# Patient Record
Sex: FEMALE | Race: WHITE | URBAN - METROPOLITAN AREA
[De-identification: names, ages, dates, MRNs, and addresses within clinical notes are randomized per-mention and may not be internally consistent; named-entity substitution may affect disease eponyms.]

---

## 2021-09-21 PROBLEM — Z34.90 PRENATAL CARE, ANTEPARTUM: Status: ACTIVE | Noted: 2021-09-21

## 2021-09-21 PROBLEM — O34.219 PREVIOUS CESAREAN SECTION COMPLICATING PREGNANCY: Status: ACTIVE | Noted: 2021-09-21

## 2021-11-28 PROBLEM — Z34.80 ENCOUNTER FOR SUPERVISION OF OTHER NORMAL PREGNANCY, UNSPECIFIED TRIMESTER: Status: ACTIVE | Noted: 2021-11-28

## 2022-01-03 PROBLEM — O98.512 COVID-19 AFFECTING PREGNANCY IN SECOND TRIMESTER: Status: ACTIVE | Noted: 2022-01-03

## 2022-01-03 PROBLEM — U07.1 COVID-19 AFFECTING PREGNANCY IN SECOND TRIMESTER: Status: ACTIVE | Noted: 2022-01-03

## 2023-05-30 LAB
AMB EXT HEMATOCRIT: 37.4
AMB EXT HEMOGLOBIN: 12.5
AMB EXT PLATELETS: 241
ANTIBODY SCREEN OB: NEGATIVE
HEPATITIS B SURFACE ANTIGEN OB: NEGATIVE
HIV RESULT OB: NEGATIVE
RAPID PLASMA REAGIN OB: NONREACTIVE
RH FACTOR OB: POSITIVE

## 2023-06-20 ENCOUNTER — TELEPHONE (OUTPATIENT)
Dept: PERINATAL CARE | Facility: HOSPITAL | Age: 38
End: 2023-06-20

## 2023-06-20 NOTE — TELEPHONE ENCOUNTER
Gave fax # [phone] to have order faxed in prior to scheduling with [facility].

## 2023-09-25 LAB
AMB EXT TREPONEMAL ANTIBODIES: NONREACTIVE
HIV RESULT OB: NEGATIVE

## 2023-11-27 LAB — STREP GP B CULT OB: POSITIVE

## 2023-12-08 ENCOUNTER — TELEPHONE (OUTPATIENT)
Dept: OBGYN UNIT | Facility: HOSPITAL | Age: 38
End: 2023-12-08

## 2023-12-11 ENCOUNTER — ANESTHESIA EVENT (OUTPATIENT)
Dept: OBGYN UNIT | Facility: HOSPITAL | Age: 38
End: 2023-12-11
Payer: COMMERCIAL

## 2023-12-11 ENCOUNTER — HOSPITAL ENCOUNTER (INPATIENT)
Facility: HOSPITAL | Age: 38
LOS: 3 days | Discharge: HOME | End: 2023-12-14
Attending: OBSTETRICS & GYNECOLOGY | Admitting: OBSTETRICS & GYNECOLOGY
Payer: COMMERCIAL

## 2023-12-11 ENCOUNTER — ANESTHESIA (OUTPATIENT)
Dept: OBGYN UNIT | Facility: HOSPITAL | Age: 38
End: 2023-12-11
Payer: COMMERCIAL

## 2023-12-11 PROBLEM — Z34.90 PREGNANT: Status: ACTIVE | Noted: 2023-12-11

## 2023-12-11 LAB
ANTIBODY SCREEN: NEGATIVE
BASOPHILS # BLD AUTO: 0.04 X10(3) UL (ref 0–0.2)
BASOPHILS NFR BLD AUTO: 0.4 %
DEPRECATED RDW RBC AUTO: 41.6 FL (ref 35.1–46.3)
EOSINOPHIL # BLD AUTO: 0.07 X10(3) UL (ref 0–0.7)
EOSINOPHIL NFR BLD AUTO: 0.7 %
ERYTHROCYTE [DISTWIDTH] IN BLOOD BY AUTOMATED COUNT: 12.7 % (ref 11–15)
HCT VFR BLD AUTO: 40.8 %
HGB BLD-MCNC: 14.5 G/DL
IMM GRANULOCYTES # BLD AUTO: 0.1 X10(3) UL (ref 0–1)
IMM GRANULOCYTES NFR BLD: 1.1 %
LYMPHOCYTES # BLD AUTO: 2.48 X10(3) UL (ref 1–4)
LYMPHOCYTES NFR BLD AUTO: 26.1 %
MCH RBC QN AUTO: 32.1 PG (ref 26–34)
MCHC RBC AUTO-ENTMCNC: 35.5 G/DL (ref 31–37)
MCV RBC AUTO: 90.3 FL
MONOCYTES # BLD AUTO: 0.72 X10(3) UL (ref 0.1–1)
MONOCYTES NFR BLD AUTO: 7.6 %
NEUTROPHILS # BLD AUTO: 6.09 X10 (3) UL (ref 1.5–7.7)
NEUTROPHILS # BLD AUTO: 6.09 X10(3) UL (ref 1.5–7.7)
NEUTROPHILS NFR BLD AUTO: 64.1 %
PLATELET # BLD AUTO: 224 10(3)UL (ref 150–450)
RBC # BLD AUTO: 4.52 X10(6)UL
RH BLOOD TYPE: POSITIVE
RH BLOOD TYPE: POSITIVE
WBC # BLD AUTO: 9.5 X10(3) UL (ref 4–11)

## 2023-12-11 PROCEDURE — 86901 BLOOD TYPING SEROLOGIC RH(D): CPT | Performed by: OBSTETRICS & GYNECOLOGY

## 2023-12-11 PROCEDURE — S0028 INJECTION, FAMOTIDINE, 20 MG: HCPCS | Performed by: OBSTETRICS & GYNECOLOGY

## 2023-12-11 PROCEDURE — 86900 BLOOD TYPING SEROLOGIC ABO: CPT | Performed by: OBSTETRICS & GYNECOLOGY

## 2023-12-11 PROCEDURE — 85025 COMPLETE CBC W/AUTO DIFF WBC: CPT | Performed by: OBSTETRICS & GYNECOLOGY

## 2023-12-11 PROCEDURE — 86850 RBC ANTIBODY SCREEN: CPT | Performed by: OBSTETRICS & GYNECOLOGY

## 2023-12-11 RX ORDER — METOCLOPRAMIDE HYDROCHLORIDE 5 MG/ML
10 INJECTION INTRAMUSCULAR; INTRAVENOUS ONCE
Status: COMPLETED | OUTPATIENT
Start: 2023-12-11 | End: 2023-12-11

## 2023-12-11 RX ORDER — MORPHINE SULFATE 1 MG/ML
INJECTION, SOLUTION EPIDURAL; INTRATHECAL; INTRAVENOUS
Status: COMPLETED | OUTPATIENT
Start: 2023-12-11 | End: 2023-12-11

## 2023-12-11 RX ORDER — FAMOTIDINE 20 MG/1
20 TABLET, FILM COATED ORAL ONCE
Status: COMPLETED | OUTPATIENT
Start: 2023-12-11 | End: 2023-12-11

## 2023-12-11 RX ORDER — KETOROLAC TROMETHAMINE 30 MG/ML
30 INJECTION, SOLUTION INTRAMUSCULAR; INTRAVENOUS ONCE
Status: DISCONTINUED | OUTPATIENT
Start: 2023-12-11 | End: 2023-12-14

## 2023-12-11 RX ORDER — CEFAZOLIN SODIUM/WATER 2 G/20 ML
2 SYRINGE (ML) INTRAVENOUS EVERY 8 HOURS
Status: COMPLETED | OUTPATIENT
Start: 2023-12-12 | End: 2023-12-12

## 2023-12-11 RX ORDER — GABAPENTIN 300 MG/1
300 CAPSULE ORAL EVERY 8 HOURS PRN
Status: DISCONTINUED | OUTPATIENT
Start: 2023-12-11 | End: 2023-12-14

## 2023-12-11 RX ORDER — ACETAMINOPHEN 500 MG
1000 TABLET ORAL ONCE
Status: COMPLETED | OUTPATIENT
Start: 2023-12-11 | End: 2023-12-11

## 2023-12-11 RX ORDER — DIPHENHYDRAMINE HYDROCHLORIDE 50 MG/ML
12.5 INJECTION INTRAMUSCULAR; INTRAVENOUS EVERY 4 HOURS PRN
Status: ACTIVE | OUTPATIENT
Start: 2023-12-11 | End: 2023-12-12

## 2023-12-11 RX ORDER — CEFAZOLIN SODIUM/WATER 2 G/20 ML
2 SYRINGE (ML) INTRAVENOUS EVERY 8 HOURS
Qty: 60 ML | Refills: 0 | Status: COMPLETED | OUTPATIENT
Start: 2023-12-11 | End: 2023-12-11

## 2023-12-11 RX ORDER — SIMETHICONE 80 MG
80 TABLET,CHEWABLE ORAL 3 TIMES DAILY PRN
Status: DISCONTINUED | OUTPATIENT
Start: 2023-12-11 | End: 2023-12-14

## 2023-12-11 RX ORDER — METOCLOPRAMIDE 10 MG/1
10 TABLET ORAL ONCE
Status: COMPLETED | OUTPATIENT
Start: 2023-12-11 | End: 2023-12-11

## 2023-12-11 RX ORDER — HYDROCODONE BITARTRATE AND ACETAMINOPHEN 7.5; 325 MG/1; MG/1
2 TABLET ORAL EVERY 6 HOURS PRN
Status: ACTIVE | OUTPATIENT
Start: 2023-12-11 | End: 2023-12-12

## 2023-12-11 RX ORDER — IBUPROFEN 600 MG/1
600 TABLET ORAL EVERY 6 HOURS
Status: DISCONTINUED | OUTPATIENT
Start: 2023-12-12 | End: 2023-12-14

## 2023-12-11 RX ORDER — PROCHLORPERAZINE EDISYLATE 5 MG/ML
5 INJECTION INTRAMUSCULAR; INTRAVENOUS ONCE AS NEEDED
Status: ACTIVE | OUTPATIENT
Start: 2023-12-11 | End: 2023-12-11

## 2023-12-11 RX ORDER — HYDROMORPHONE HYDROCHLORIDE 2 MG/1
2 TABLET ORAL EVERY 4 HOURS PRN
Status: DISCONTINUED | OUTPATIENT
Start: 2023-12-11 | End: 2023-12-14

## 2023-12-11 RX ORDER — HYDROMORPHONE HYDROCHLORIDE 1 MG/ML
0.2 INJECTION, SOLUTION INTRAMUSCULAR; INTRAVENOUS; SUBCUTANEOUS EVERY 2 HOUR PRN
Status: DISCONTINUED | OUTPATIENT
Start: 2023-12-11 | End: 2023-12-14

## 2023-12-11 RX ORDER — FAMOTIDINE 10 MG/ML
20 INJECTION, SOLUTION INTRAVENOUS ONCE
Status: COMPLETED | OUTPATIENT
Start: 2023-12-11 | End: 2023-12-11

## 2023-12-11 RX ORDER — BUPIVACAINE HYDROCHLORIDE 7.5 MG/ML
INJECTION, SOLUTION INTRASPINAL
Status: COMPLETED | OUTPATIENT
Start: 2023-12-11 | End: 2023-12-11

## 2023-12-11 RX ORDER — DIPHENHYDRAMINE HCL 25 MG
25 CAPSULE ORAL EVERY 4 HOURS PRN
Status: ACTIVE | OUTPATIENT
Start: 2023-12-11 | End: 2023-12-12

## 2023-12-11 RX ORDER — CITRIC ACID/SODIUM CITRATE 334-500MG
30 SOLUTION, ORAL ORAL ONCE
Status: COMPLETED | OUTPATIENT
Start: 2023-12-11 | End: 2023-12-11

## 2023-12-11 RX ORDER — CHOLECALCIFEROL (VITAMIN D3) 25 MCG
1 TABLET,CHEWABLE ORAL DAILY
Status: DISCONTINUED | OUTPATIENT
Start: 2023-12-11 | End: 2023-12-14

## 2023-12-11 RX ORDER — HYDROCODONE BITARTRATE AND ACETAMINOPHEN 7.5; 325 MG/1; MG/1
1 TABLET ORAL EVERY 6 HOURS PRN
Status: ACTIVE | OUTPATIENT
Start: 2023-12-11 | End: 2023-12-12

## 2023-12-11 RX ORDER — HALOPERIDOL 5 MG/ML
0.5 INJECTION INTRAMUSCULAR ONCE AS NEEDED
Status: ACTIVE | OUTPATIENT
Start: 2023-12-11 | End: 2023-12-11

## 2023-12-11 RX ORDER — ONDANSETRON 2 MG/ML
4 INJECTION INTRAMUSCULAR; INTRAVENOUS EVERY 6 HOURS PRN
Status: DISCONTINUED | OUTPATIENT
Start: 2023-12-11 | End: 2023-12-14

## 2023-12-11 RX ORDER — ACETAMINOPHEN 500 MG
1000 TABLET ORAL EVERY 6 HOURS
Status: DISCONTINUED | OUTPATIENT
Start: 2023-12-11 | End: 2023-12-14

## 2023-12-11 RX ORDER — NALBUPHINE HYDROCHLORIDE 10 MG/ML
2.5 INJECTION, SOLUTION INTRAMUSCULAR; INTRAVENOUS; SUBCUTANEOUS EVERY 4 HOURS PRN
Status: ACTIVE | OUTPATIENT
Start: 2023-12-11 | End: 2023-12-12

## 2023-12-11 RX ORDER — NALOXONE HYDROCHLORIDE 0.4 MG/ML
0.08 INJECTION, SOLUTION INTRAMUSCULAR; INTRAVENOUS; SUBCUTANEOUS
Status: ACTIVE | OUTPATIENT
Start: 2023-12-11 | End: 2023-12-12

## 2023-12-11 RX ORDER — NALBUPHINE HYDROCHLORIDE 10 MG/ML
2.5 INJECTION, SOLUTION INTRAMUSCULAR; INTRAVENOUS; SUBCUTANEOUS
Status: DISCONTINUED | OUTPATIENT
Start: 2023-12-11 | End: 2023-12-14

## 2023-12-11 RX ORDER — SODIUM CHLORIDE, SODIUM LACTATE, POTASSIUM CHLORIDE, CALCIUM CHLORIDE 600; 310; 30; 20 MG/100ML; MG/100ML; MG/100ML; MG/100ML
INJECTION, SOLUTION INTRAVENOUS CONTINUOUS
Status: DISCONTINUED | OUTPATIENT
Start: 2023-12-11 | End: 2023-12-14

## 2023-12-11 RX ORDER — PHENYLEPHRINE HCL 10 MG/ML
VIAL (ML) INJECTION AS NEEDED
Status: DISCONTINUED | OUTPATIENT
Start: 2023-12-11 | End: 2023-12-11 | Stop reason: SURG

## 2023-12-11 RX ORDER — CEFAZOLIN SODIUM/WATER 2 G/20 ML
2 SYRINGE (ML) INTRAVENOUS ONCE
Status: COMPLETED | OUTPATIENT
Start: 2023-12-11 | End: 2023-12-11

## 2023-12-11 RX ORDER — ACETAMINOPHEN 325 MG/1
650 TABLET ORAL EVERY 6 HOURS PRN
Status: ACTIVE | OUTPATIENT
Start: 2023-12-11 | End: 2023-12-12

## 2023-12-11 RX ORDER — SODIUM CHLORIDE, SODIUM LACTATE, POTASSIUM CHLORIDE, CALCIUM CHLORIDE 600; 310; 30; 20 MG/100ML; MG/100ML; MG/100ML; MG/100ML
125 INJECTION, SOLUTION INTRAVENOUS CONTINUOUS
Status: DISCONTINUED | OUTPATIENT
Start: 2023-12-11 | End: 2023-12-11 | Stop reason: HOSPADM

## 2023-12-11 RX ORDER — ONDANSETRON 2 MG/ML
INJECTION INTRAMUSCULAR; INTRAVENOUS AS NEEDED
Status: DISCONTINUED | OUTPATIENT
Start: 2023-12-11 | End: 2023-12-11 | Stop reason: SURG

## 2023-12-11 RX ORDER — KETOROLAC TROMETHAMINE 30 MG/ML
INJECTION, SOLUTION INTRAMUSCULAR; INTRAVENOUS AS NEEDED
Status: DISCONTINUED | OUTPATIENT
Start: 2023-12-11 | End: 2023-12-11 | Stop reason: SURG

## 2023-12-11 RX ORDER — DOCUSATE SODIUM 100 MG/1
100 CAPSULE, LIQUID FILLED ORAL
Status: DISCONTINUED | OUTPATIENT
Start: 2023-12-11 | End: 2023-12-14

## 2023-12-11 RX ORDER — ONDANSETRON 2 MG/ML
4 INJECTION INTRAMUSCULAR; INTRAVENOUS EVERY 6 HOURS PRN
Status: DISCONTINUED | OUTPATIENT
Start: 2023-12-11 | End: 2023-12-11 | Stop reason: ALTCHOICE

## 2023-12-11 RX ORDER — AMMONIA INHALANTS 0.04 G/.3ML
0.3 INHALANT RESPIRATORY (INHALATION) AS NEEDED
Status: DISCONTINUED | OUTPATIENT
Start: 2023-12-11 | End: 2023-12-14

## 2023-12-11 RX ORDER — ONDANSETRON 2 MG/ML
4 INJECTION INTRAMUSCULAR; INTRAVENOUS ONCE AS NEEDED
Status: ACTIVE | OUTPATIENT
Start: 2023-12-11 | End: 2023-12-11

## 2023-12-11 RX ORDER — KETOROLAC TROMETHAMINE 30 MG/ML
30 INJECTION, SOLUTION INTRAMUSCULAR; INTRAVENOUS EVERY 6 HOURS
Status: COMPLETED | OUTPATIENT
Start: 2023-12-11 | End: 2023-12-12

## 2023-12-11 RX ORDER — BISACODYL 10 MG
10 SUPPOSITORY, RECTAL RECTAL ONCE AS NEEDED
Status: DISCONTINUED | OUTPATIENT
Start: 2023-12-11 | End: 2023-12-14

## 2023-12-11 RX ORDER — HYDROMORPHONE HYDROCHLORIDE 1 MG/ML
0.6 INJECTION, SOLUTION INTRAMUSCULAR; INTRAVENOUS; SUBCUTANEOUS
Status: ACTIVE | OUTPATIENT
Start: 2023-12-11 | End: 2023-12-12

## 2023-12-11 RX ORDER — HYDROMORPHONE HYDROCHLORIDE 1 MG/ML
0.4 INJECTION, SOLUTION INTRAMUSCULAR; INTRAVENOUS; SUBCUTANEOUS
Status: ACTIVE | OUTPATIENT
Start: 2023-12-11 | End: 2023-12-12

## 2023-12-11 RX ADMIN — SODIUM CHLORIDE, SODIUM LACTATE, POTASSIUM CHLORIDE, CALCIUM CHLORIDE: 600; 310; 30; 20 INJECTION, SOLUTION INTRAVENOUS at 08:41:00

## 2023-12-11 RX ADMIN — MORPHINE SULFATE 0.3 MG: 1 INJECTION, SOLUTION EPIDURAL; INTRATHECAL; INTRAVENOUS at 08:26:00

## 2023-12-11 RX ADMIN — KETOROLAC TROMETHAMINE 30 MG: 30 INJECTION, SOLUTION INTRAMUSCULAR; INTRAVENOUS at 09:05:00

## 2023-12-11 RX ADMIN — PHENYLEPHRINE HCL 200 MCG: 10 MG/ML VIAL (ML) INJECTION at 08:38:00

## 2023-12-11 RX ADMIN — SODIUM CHLORIDE, SODIUM LACTATE, POTASSIUM CHLORIDE, CALCIUM CHLORIDE: 600; 310; 30; 20 INJECTION, SOLUTION INTRAVENOUS at 09:20:00

## 2023-12-11 RX ADMIN — BUPIVACAINE HYDROCHLORIDE 1.5 ML: 7.5 INJECTION, SOLUTION INTRASPINAL at 08:26:00

## 2023-12-11 RX ADMIN — SODIUM CHLORIDE, SODIUM LACTATE, POTASSIUM CHLORIDE, CALCIUM CHLORIDE: 600; 310; 30; 20 INJECTION, SOLUTION INTRAVENOUS at 08:21:00

## 2023-12-11 RX ADMIN — SODIUM CHLORIDE, SODIUM LACTATE, POTASSIUM CHLORIDE, CALCIUM CHLORIDE: 600; 310; 30; 20 INJECTION, SOLUTION INTRAVENOUS at 09:12:00

## 2023-12-11 RX ADMIN — CEFAZOLIN SODIUM/WATER 2 G: 2 G/20 ML SYRINGE (ML) INTRAVENOUS at 08:31:00

## 2023-12-11 RX ADMIN — ONDANSETRON 4 MG: 2 INJECTION INTRAMUSCULAR; INTRAVENOUS at 09:05:00

## 2023-12-11 RX ADMIN — PHENYLEPHRINE HCL 200 MCG: 10 MG/ML VIAL (ML) INJECTION at 08:31:00

## 2023-12-11 NOTE — H&P
[facility]     section History & Physical    Amada Marie Patient Status:  Surgery Admit - Inpt    1985 MRN [id number]   Location [address]  Attending Alfredo Rollins MD   Hosp Day # 0 PCP Unknown Pcp     Date of Admission:  2023    Reason for Admission: Scheduled  section    HPI:   Amada Marie is a 41year old  female, current EGA of 39w0d with an estimated date of delivery of: 2023, by Last Menstrual Period who presents for scheduled  section. Pt denies N/V/F/C/CP/SOB, LOF, VB, dizziness, RUQ pain, blurry vision, HA.  (+) FM. Her current obstetrical history is significant for  AMA, h/o C/S x 2 and GBS (+) . Patient Active Problem List   Diagnosis    Prenatal care, antepartum    Previous  section complicating pregnancy    Encounter for supervision of other normal pregnancy, unspecified trimester    COVID-19 affecting pregnancy in second trimester       Fetal Movement reported as Yes. GBS positive. Rh positive. History   Obstetric History:   OB History    Para Term  AB Living   3 2 2     2   SAB IAB Ectopic Multiple Live Births           2      # Outcome Date GA Lbr Uriah/2nd Weight Sex Delivery Anes PTL Lv   3 Current            2 Term 22 40w0d  7 lb 10 oz (3.459 kg) M CS-LTranv Spinal  KALYA      Birth Comments: Repeat low transverse    1 Term 19 39w2d  6 lb 11 oz (3.033 kg) M CS-Unspec Spinal N KAYLA      Birth Comments: breech       Gyne History:  Last pap smear: 2023: Negative cytology    Past Medical History: None    Past Surgerical History:   Past Surgical History:   Procedure Laterality Date      2019       Social History:   Social History     Tobacco Use    Smoking status: Never    Smokeless tobacco: Never   Substance Use Topics    Alcohol use: Not Currently        Allergies/Medications: Allergies:    Allergies   Allergen Reactions Pantoprazole RASH       Medications:  No medications prior to admission. Review of Systems:   As documented in HPI      Physical Exam:        Constitutional: alert and cooperative, in no apparent distress    Abdomen: gravid, nontender, Gravid    Vaginal exam: deferred    FHT assessment:   Baseline: 120 bpm   Variability: moderate   Accels:  present   Decels: No   Tocos:  Irregular   Category: 1 tracing    Neurologic: Alert and oriented;   Psychiatric: Cooperative    Results:   No results found for this or any previous visit (from the past 24 hour(s)). No results found. Assessment/Plan:   IUP at 39w0d for scheduled  section. Obstetrical history significant for  AMA, h/o C/S x 2 and GBS (+)  . Patient Active Problem List   Diagnosis    Prenatal care, antepartum    Previous  section complicating pregnancy    Encounter for supervision of other normal pregnancy, unspecified trimester    COVID-19 affecting pregnancy in second trimester       Anesthesia planned: spinal    Risks, benefits, alternatives and possible complications have been discussed in detail with the patient. Risks including but not limited to pain, bleeding, infection, possibility of adhesions, damage to adjacent organs (including but not limited to ureters, bowel, bladder, major blood vessels, and nerve endings). PT verbalized understanding and wished to proceed with a repeat C/S. Pre-admission, admission, and post admission procedures and expectations were discussed in detail. All questions answered; all appropriate consents will be signed at the [address] [place] [address] at 39 1/7 wks  FWBR overall: category 1  H/o C/S x 2: desires repeat C/S.   Discussed r/b/a and she verbalized understanding and wishes to proceed with C/S  GBS (+): Pt will receive surgical Ppx  CPM    Karel Fu MD  12/10/2023  8:25 PM

## 2023-12-11 NOTE — ANESTHESIA PROCEDURE NOTES
Spinal Block    Date/Time: 12/11/2023 8:26 AM    Performed by: Kaylie Kaye MD  Authorized by: Kaylie Kaye MD      General Information and Staff    Start Time:  12/11/2023 8:26 AM  End Time:  12/11/2023 8:27 AM  Anesthesiologist:  Kaylie Kaye MD  Performed by:   Anesthesiologist  Patient Location:  OB  Site identification: surface landmarks    Reason for Block: at surgeon's request, post-op pain management and surgical anesthesia    Preanesthetic Checklist: patient identified, IV checked, risks and benefits discussed, monitors and equipment checked, pre-op evaluation, timeout performed, anesthesia consent and sterile technique used      Procedure Details    Patient Position:  Sitting  Prep: ChloraPrep    Monitoring:  Cardiac monitor  Approach:  Midline  Location:  L3-4  Injection Technique:  Single-shot    Needle    Needle Type:  Pencil-tip  Needle Gauge:  24 G  Needle Length:  3.5 in    Assessment    Sensory Level:  T4  Events: clear CSF, CSF aspirated, well tolerated and blood negative      Additional Comments

## 2023-12-11 NOTE — PROGRESS NOTES
Patient transferred to mother/baby room 360 per cart in stable condition with baby and personal belongings. Accompanied by  and staff. Report given to mother/baby RN Zhanna.

## 2023-12-11 NOTE — PROGRESS NOTES
Pt is a 41year old female admitted to TR5/TR5-A. Chief Complaint   Patient presents with    Scheduled      Prev C/S x2      Pt is  39w1d intra-uterine pregnancy. History obtained, consents signed. Oriented to room, staff, and plan of care.

## 2023-12-12 LAB
BASOPHILS # BLD AUTO: 0.03 X10(3) UL (ref 0–0.2)
BASOPHILS NFR BLD AUTO: 0.2 %
DEPRECATED RDW RBC AUTO: 43.8 FL (ref 35.1–46.3)
EOSINOPHIL # BLD AUTO: 0.09 X10(3) UL (ref 0–0.7)
EOSINOPHIL NFR BLD AUTO: 0.7 %
ERYTHROCYTE [DISTWIDTH] IN BLOOD BY AUTOMATED COUNT: 12.9 % (ref 11–15)
HCT VFR BLD AUTO: 35 %
HGB BLD-MCNC: 11.8 G/DL
IMM GRANULOCYTES # BLD AUTO: 0.09 X10(3) UL (ref 0–1)
IMM GRANULOCYTES NFR BLD: 0.7 %
LYMPHOCYTES # BLD AUTO: 1.93 X10(3) UL (ref 1–4)
LYMPHOCYTES NFR BLD AUTO: 15.9 %
MCH RBC QN AUTO: 31.4 PG (ref 26–34)
MCHC RBC AUTO-ENTMCNC: 33.7 G/DL (ref 31–37)
MCV RBC AUTO: 93.1 FL
MONOCYTES # BLD AUTO: 0.85 X10(3) UL (ref 0.1–1)
MONOCYTES NFR BLD AUTO: 7 %
NEUTROPHILS # BLD AUTO: 9.18 X10 (3) UL (ref 1.5–7.7)
NEUTROPHILS # BLD AUTO: 9.18 X10(3) UL (ref 1.5–7.7)
NEUTROPHILS NFR BLD AUTO: 75.5 %
PLATELET # BLD AUTO: 195 10(3)UL (ref 150–450)
RBC # BLD AUTO: 3.76 X10(6)UL
WBC # BLD AUTO: 12.2 X10(3) UL (ref 4–11)

## 2023-12-12 PROCEDURE — 85025 COMPLETE CBC W/AUTO DIFF WBC: CPT | Performed by: OBSTETRICS & GYNECOLOGY

## 2023-12-12 NOTE — OPERATIVE REPORT
[facility]     Section Delivery / Operative Note    Sofie Juan Patient Status:  Inpatient    1985 MRN [id number]   Location [facility] 3SE Attending Enoc Gresham MD   Hosp Day # 0 PCP Unknown Pcp     Pre Op Diagnosis:    IUP at 39 1/7 wks,   H/o C/S x 2  GBS (+)    Post Op Diagnosis:    IUPD at 44 1/7 wks  H/o C/S x 2  GBS (+)  Delivery    Procedure:   repeat  LTCS Procedure(s):   SECTION    Surgeon:  Atnony Wang MD    Assistant Surgeon:  Dr. Ravindra Rod     Anesthesia: spinal Spinal    Complications: none     Antibiotics:  Ancef 2 grams preoperatively    EBL: 230 ml  QBL: 103ml    Delivery Fluids: LR  Urine Output: 100ml  Urine Color: clear    Specimens:   Specimens (From admission, onward)      None            Intra-operative Findings: normal uterus, normal tubes bilaterally, normal ovaries bilaterally. Live male infant, Apgars 8 & 9, weight 7 lbs, 0.5 oz   Position: cephalic  Nuchal Cord:  none  clear amniotic fluid noted. Delayed cord clamping performed. Infant:  Date of Delivery: 2023    Time of Delivery: 8:43 AM   Delivery Type: Caesarean Section     Infant Sex  Information for the patient's :  Darya Pop [[id number]]   male     Infant Birthweight  Information for the patient's :  Darya Pop [[id number]]   7 lb 0.5 oz (3.19 kg)      Presentation Vertex [1]   Position          Apgars:  1 minute: 8                5 minutes: 9                         10 minutes:      Placenta:  Date/Time of Delivery: 2023  8:47 AM    Delivery: manual extraction  Placenta to Pathology: no    Cord:  Cord Gases Submitted: no  Cord Blood/Tissue Collection: yes  Cord Complications: none    Sponge and Needle Counts:  Verified    Operative note:  Consent obtained and placed in the chart. Risks, benefits, alternatives discussed with the patient.   Risks including but not limited to pain, bleeding, infection, damage to adjacent organs including but not limited to ureters, bowel, bladder, nerves, major blood vessels, nerve ending. The patient verbalized understanding of risks, benefits, alternatives and wished to proceed with the  section. The patient was brought into the operating room. Spinal anesthesia was placed. A Murdock catheter was placed. The patient was tilted to her left side. Fetal heart tones were regular and reactive. Sequential compression devices were in place and the patient was grounded. She was then prepped and draped in a normal sterile fashion in a dorsal supine position with a leftward tilt. A time out was performed. An adequate level of anesthesia was ascertained. Previous Pfannenstiel skin incision was removed and then scalpel was carried down to the subcutaneous tissues to the abdominal fascia which was incised on either side at the midline. The fascial incision was extended upward and laterally bilaterally using Mccall scissors. Subcutaneous bleeders and perforated bleeders were clamped and cauterized. The inferior aspect of the fascial incision was then grasped with Kocher clamps, elevated and the underlying rectus muscles were dissected off sharply. Attention was then turned to the superior aspect of the incision, in a similar fashion was grasped and tented up with Kocher clamps and the rectus muscles were dissected off with Mccall scissors. The rectus muscles were then  in the midline. The peritoneum was identified and entered bluntly. Upon entrance, the peritoneum was elevated and extended superiorly to the inferior aspect of the umbilicus and inferiorly to the superior aspect of the bladder using Metzenbaum scissors. The bladder blade was inserted. The vesicouterine peritoneum was identified and a bladder flap was created with Metzenbaum scissors and carried upward and laterally bilaterally.   Then using blunt dissection, a bladder flap was created and the bladder was dissected off the lower uterine segment. A low transverse incision was made with a scalpel on the uterus. This was carried upward and laterally bilaterally with bandage scissors. Clear amniotic fluid was noted. The baby was found to be in the occiput anterior  presentation. The infant's head was guided through the incision while fundal pressure was applied by assistant atraumatically. There was no nuchal cord noted. The nose and mouth were then suctioned. The baby's arms, body and feet followed without complication. Delayed cord clamping was performed. The infant was handed off to the awaiting neonatology team. Cord blood collection was performed. Then the typical cord blood was collected. The placenta was then removed completely intact and manually. The uterus was exteriorized. The uterine cavity was then cleaned off of all clot and debris. The uterus was closed in two layer fashion, first being interlocking, the second being imbricating using 0-Vicryl. Good hemostasis was noted throughout. The bladder flap was then reapproximated with 3-0 Vicryl. Good hemostasis continued to be noted. The uterus was placed back into the pelvic cavity. The gutters were cleared off of all clot and debris. Bilateral tubes and ovaries appeared to be within normal and in the correct anatomical location. Re-inspection of the hysterotomy, peritoneum and rectus muscles noted them to be entirely hemostatic. The rectus muscles on the patient's right are scant, noting mucle more cephalad rather tan caudally. The peritoneum was approximated with 3-0 Vicryl in a running stitch. The abdominal fascia was closed in two halves using 0 Vicryl. The subcutaneous tissue was reapproximated with 3-0 Vicryl and the skin was reapproximated with subcuticular stitch of 4-0 Monocryl. The incision was then cleaned and then skin adhesive was placed across the incision. The patient had tolerated the procedure well.   Sponge, lap, and needle counts were correct x2. No complications were noted. The placenta was inspected and the cord was noted to have three-vessel cord. Cord blood was collected and the patient was then subsequently transferred to recovery in good condition. The surgical assistant provided aid in exposure, hemostasis, closure, and other intraoperative technical functions to help the surgeon carry out a safe operation and optimize results.      Sreedhar Garay MD  12/11/2023  7:22 PM

## 2023-12-13 NOTE — LACTATION NOTE
LACTATION NOTE - MOTHER      Evaluation Type: Inpatient    Problems identified  Problems identified: Knowledge deficit; Nipple pain  Problems Identified Other: Weight loss 7.8%    Maternal history  Maternal history: Caesarean section    Breastfeeding goal  Breastfeeding goal: To maintain breast milk feeding per patient goal    Maternal Assessment  Bilateral Breasts: Soft;Symmetrical  Bilateral Nipples: Colostrum easily expressed; Everted; Sore  Prior breastfeeding experience (comment below): Multip; Successful  Prior BF experience: comment: 8 months x 2 children  Breastfeeding Assistance: Breastfeeding assistance provided with permission    Pain assessment  Pain, additional: Pain w/initial sucks only    Guidelines for use of:  Current use of pump[de-identified] Declined  Suggested use of pump: Pump if infant is not latching to breast  Other (comment): Mom reports pinching with breastfeeding, able to correct with posiitoning and assisting infant lips into flanged position. Discussed early hunger cues, frequent feedings every 1-2 hours based on cues and supplementing with own BM via hand expression or pumping. Breast pump declined at this time, mom agreeable to increasing feedings. Reinforced prompting/stimulating infant to increase activity and offering both breasts.

## 2023-12-13 NOTE — LACTATION NOTE
This note was copied from a baby's chart. LACTATION NOTE - INFANT    Evaluation Type  Evaluation Type: Inpatient    Problems & Assessment  Problems Diagnosed or Identified: Latch difficulty  Infant Assessment: Hunger cues present  Muscle tone: Appropriate for GA    Feeding Assessment  Summary Current Feeding: Adlib;Breastfeeding exclusively  Breastfeeding Assessment: Assisted with breastfeeding w/mother's permission;Sustained nutrititive latch w/audible swallows; Coordinated suck/swallow; Tolerated feeding well  Breastfeeding lasted # of minutes: 10  Breastfeeding Positions: cross cradle;left breast  Latch: Grasps breast, tongue down, lips flanged, rhythmic sucking  Audible Sucks/Swallows: Spontaneous and intermittent (24 hours old)  Type of Nipple: Everted (after stimulation)  Comfort (Breast/Nipple): Filling, red/small blisters/bruises, mild/mod discomfort  Hold (Positioning): No assist from staff, mother able to position/hold infant  LATCH Score: 9

## 2023-12-14 VITALS
WEIGHT: 151 LBS | SYSTOLIC BLOOD PRESSURE: 103 MMHG | HEART RATE: 79 BPM | BODY MASS INDEX: 26 KG/M2 | DIASTOLIC BLOOD PRESSURE: 75 MMHG | OXYGEN SATURATION: 97 % | RESPIRATION RATE: 14 BRPM | TEMPERATURE: 98 F

## 2023-12-14 RX ORDER — GABAPENTIN 300 MG/1
300 CAPSULE ORAL EVERY 8 HOURS PRN
Qty: 20 CAPSULE | Refills: 0 | Status: SHIPPED | OUTPATIENT
Start: 2023-12-14

## 2023-12-14 RX ORDER — IBUPROFEN 600 MG/1
600 TABLET ORAL EVERY 6 HOURS
Qty: 30 TABLET | Refills: 0 | Status: SHIPPED | OUTPATIENT
Start: 2023-12-14

## 2023-12-14 RX ORDER — HYDROMORPHONE HYDROCHLORIDE 2 MG/1
2 TABLET ORAL EVERY 6 HOURS PRN
Qty: 12 TABLET | Refills: 0 | Status: SHIPPED | OUTPATIENT
Start: 2023-12-14

## 2023-12-14 RX ORDER — NALOXONE HYDROCHLORIDE 4 MG/.1ML
4 SPRAY NASAL AS NEEDED
Qty: 1 KIT | Refills: 0 | Status: SHIPPED | OUTPATIENT
Start: 2023-12-14

## 2023-12-14 NOTE — DISCHARGE INSTRUCTIONS
Pelvic rest for 6 weeks. No sex, tampons, nothing in the vagina. No sex, tampons, hot tubs or jacuzzis. No direct water/soap on inicsion. Incision may get wet and soapy, just not directly. May run down. Pat dry really well before covering incision with clothes. Watch for any signs of infection: redness, swelling, pain to the touch, foul smelling odor or discharge or fever. No heavy lifting, nothing greater then 10-15 pounds. Nothing heavier then the baby. No strenuous activity including exercise, excessive stairs or heavy housework. No driving for at least 2 weeks by yourself or while taking narcotics. Call Md for any questions or concerns including: temp over 100.4, increased pain, increased bleeding or any signs of postpartum depression. Parents support groups:  Cradle talk online (Monday & Friday at 10am)--- support group for any parents of a  in our community-- [employer]-- for any expectant or new mom who may be experiencing anxiety, stress or depression. It is lead by a licensed clinical therapist with the option of in-person or online meetings: In person meets once a month for [employer], registration is required  for either option and online ( and  of the month)  virtual on [employer]. For more information for either Cradle talk or Nurturing Mom or to receive a email invite contact Carson Braver. [email or web address]     In person Mom & Baby hour support group is every Wednesday from  am, at [facility] lead by a lactational consultant, Take [employer] to the basement and look for signs for new baby support group. No Registration required just show up, for any new babies, doesn't need to be your first!!! Faisal Crespo for breast feeding and parent support, Website: [employer].org  and for the [facility] group and other groups visit [email or web address]  to help find a group, all meetings are virtual.     Mom's Line: ([phone]   This service is provided by [facility], has a phone line dedicated women (or anyone worried about a women) who may be experiencing signs or symptoms of postpartum depression.     VocalizeLocal groups-  for more support when home- [address] [facility] --- you can find mom-to-mom advice and the list of speaker topics for cradle talk program.

## 2025-05-14 ENCOUNTER — TELEPHONE (OUTPATIENT)
Dept: MATERNAL FETAL MEDICINE | Age: 40
End: 2025-05-14

## 2025-05-15 ENCOUNTER — HOSPITAL ENCOUNTER (OUTPATIENT)
Age: 40
End: 2025-05-15
Attending: OBSTETRICS & GYNECOLOGY | Admitting: OBSTETRICS & GYNECOLOGY

## 2025-05-15 ENCOUNTER — E-ADVICE (OUTPATIENT)
Dept: MATERNAL FETAL MEDICINE | Age: 40
End: 2025-05-15

## 2025-05-15 ENCOUNTER — APPOINTMENT (OUTPATIENT)
Dept: MATERNAL FETAL MEDICINE | Age: 40
End: 2025-05-15

## 2025-05-15 ENCOUNTER — CLINICAL DOCUMENTATION (OUTPATIENT)
Dept: MATERNAL FETAL MEDICINE | Age: 40
End: 2025-05-15

## 2025-05-15 ENCOUNTER — OFFICE VISIT (OUTPATIENT)
Dept: MATERNAL FETAL MEDICINE | Age: 40
End: 2025-05-15

## 2025-05-15 ENCOUNTER — OFFICE VISIT (OUTPATIENT)
Dept: GENETICS | Age: 40
End: 2025-05-15

## 2025-05-15 VITALS
SYSTOLIC BLOOD PRESSURE: 116 MMHG | HEART RATE: 99 BPM | DIASTOLIC BLOOD PRESSURE: 76 MMHG | HEIGHT: 65 IN | BODY MASS INDEX: 22.76 KG/M2 | WEIGHT: 136.6 LBS | OXYGEN SATURATION: 99 %

## 2025-05-15 DIAGNOSIS — Z36.9 ENCOUNTER FOR ANTENATAL SCREENING OF MOTHER (CMD): Primary | ICD-10-CM

## 2025-05-15 DIAGNOSIS — O09.522 MULTIGRAVIDA OF ADVANCED MATERNAL AGE IN SECOND TRIMESTER (CMD): Primary | ICD-10-CM

## 2025-05-15 DIAGNOSIS — O41.02X1: ICD-10-CM

## 2025-05-15 DIAGNOSIS — O09.522 MULTIGRAVIDA OF ADVANCED MATERNAL AGE IN SECOND TRIMESTER (CMD): ICD-10-CM

## 2025-05-15 DIAGNOSIS — Z3A.16 16 WEEKS GESTATION OF PREGNANCY (CMD): ICD-10-CM

## 2025-05-15 DIAGNOSIS — O43.022 TWIN TO TWIN TRANSFUSION IN SECOND TRIMESTER (CMD): ICD-10-CM

## 2025-05-15 DIAGNOSIS — O34.219 PREVIOUS CESAREAN SECTION COMPLICATING PREGNANCY, ANTEPARTUM CONDITION OR COMPLICATION (CMD): ICD-10-CM

## 2025-05-15 DIAGNOSIS — O30.032 MONOCHORIONIC DIAMNIOTIC TWIN GESTATION IN SECOND TRIMESTER (CMD): ICD-10-CM

## 2025-05-15 DIAGNOSIS — O30.032 MONOCHORIONIC DIAMNIOTIC TWIN PREGNANCY IN SECOND TRIMESTER (CMD): ICD-10-CM

## 2025-05-15 PROCEDURE — 96041 GENETIC COUNSELING SVC EA 30: CPT

## 2025-05-15 RX ORDER — VITAMIN A ACETATE, BETA CAROTENE, ASCORBIC ACID, CHOLECALCIFEROL, .ALPHA.-TOCOPHEROL ACETATE, DL-, THIAMINE MONONITRATE, RIBOFLAVIN, NIACINAMIDE, PYRIDOXINE HYDROCHLORIDE, FOLIC ACID, CYANOCOBALAMIN, CALCIUM CARBONATE, FERROUS FUMARATE, ZINC OXIDE, CUPRIC OXIDE 3080; 12; 120; 400; 1; 1.84; 3; 20; 22; 920; 25; 200; 27; 10; 2 [IU]/1; UG/1; MG/1; [IU]/1; MG/1; MG/1; MG/1; MG/1; MG/1; [IU]/1; MG/1; MG/1; MG/1; MG/1; MG/1
1 TABLET, FILM COATED ORAL DAILY
COMMUNITY

## 2025-05-15 RX ORDER — ASPIRIN 81 MG/1
121.5 TABLET, CHEWABLE ORAL
COMMUNITY
Start: 2025-04-23 | End: 2026-04-18

## 2025-05-15 ASSESSMENT — PAIN SCALES - GENERAL: PAINLEVEL_OUTOF10: 0

## 2025-05-16 ENCOUNTER — E-ADVICE (OUTPATIENT)
Dept: MATERNAL FETAL MEDICINE | Age: 40
End: 2025-05-16

## 2025-05-19 ENCOUNTER — APPOINTMENT (OUTPATIENT)
Dept: MATERNAL FETAL MEDICINE | Age: 40
End: 2025-05-19

## 2025-05-19 VITALS
SYSTOLIC BLOOD PRESSURE: 115 MMHG | DIASTOLIC BLOOD PRESSURE: 81 MMHG | BODY MASS INDEX: 23.49 KG/M2 | WEIGHT: 141 LBS | HEART RATE: 106 BPM | HEIGHT: 65 IN | OXYGEN SATURATION: 100 %

## 2025-05-19 DIAGNOSIS — O30.032 MONOCHORIONIC DIAMNIOTIC TWIN GESTATION IN SECOND TRIMESTER (CMD): ICD-10-CM

## 2025-05-19 DIAGNOSIS — Z3A.16 16 WEEKS GESTATION OF PREGNANCY (CMD): ICD-10-CM

## 2025-05-19 DIAGNOSIS — O02.1 IUFD AT LESS THAN 20 WEEKS OF GESTATION (CMD): ICD-10-CM

## 2025-05-19 DIAGNOSIS — Z3A.16 16 WEEKS GESTATION OF PREGNANCY (CMD): Primary | ICD-10-CM

## 2025-05-19 DIAGNOSIS — O09.522 MULTIGRAVIDA OF ADVANCED MATERNAL AGE IN SECOND TRIMESTER (CMD): Primary | ICD-10-CM

## 2025-05-19 DIAGNOSIS — O41.02X1: ICD-10-CM

## 2025-05-19 DIAGNOSIS — O09.522 MULTIGRAVIDA OF ADVANCED MATERNAL AGE IN SECOND TRIMESTER (CMD): ICD-10-CM

## 2025-05-19 DIAGNOSIS — O43.022 TWIN TO TWIN TRANSFUSION IN SECOND TRIMESTER (CMD): ICD-10-CM

## 2025-05-19 DIAGNOSIS — O34.219 PREVIOUS CESAREAN SECTION COMPLICATING PREGNANCY, ANTEPARTUM CONDITION OR COMPLICATION (CMD): ICD-10-CM

## 2025-05-19 ASSESSMENT — PAIN SCALES - GENERAL: PAINLEVEL_OUTOF10: 0

## 2025-05-20 ENCOUNTER — OFFICE VISIT (OUTPATIENT)
Dept: MATERNAL FETAL MEDICINE | Age: 40
End: 2025-05-20

## 2025-05-20 VITALS
OXYGEN SATURATION: 100 % | HEIGHT: 65 IN | DIASTOLIC BLOOD PRESSURE: 82 MMHG | SYSTOLIC BLOOD PRESSURE: 125 MMHG | WEIGHT: 140.9 LBS | BODY MASS INDEX: 23.47 KG/M2 | HEART RATE: 102 BPM

## 2025-05-20 DIAGNOSIS — O43.022 TWIN TO TWIN TRANSFUSION IN SECOND TRIMESTER (CMD): ICD-10-CM

## 2025-05-20 DIAGNOSIS — O30.032 MONOCHORIONIC DIAMNIOTIC TWIN GESTATION IN SECOND TRIMESTER (CMD): ICD-10-CM

## 2025-05-20 DIAGNOSIS — O02.1 IUFD AT LESS THAN 20 WEEKS OF GESTATION (CMD): ICD-10-CM

## 2025-05-20 DIAGNOSIS — Z3A.17 17 WEEKS GESTATION OF PREGNANCY (CMD): Primary | ICD-10-CM

## 2025-05-20 DIAGNOSIS — Z3A.17 17 WEEKS GESTATION OF PREGNANCY (CMD): ICD-10-CM

## 2025-05-20 DIAGNOSIS — O30.032 MONOCHORIONIC DIAMNIOTIC TWIN GESTATION IN SECOND TRIMESTER (CMD): Primary | ICD-10-CM

## 2025-05-20 DIAGNOSIS — O34.219 PREVIOUS CESAREAN SECTION COMPLICATING PREGNANCY, ANTEPARTUM CONDITION OR COMPLICATION (CMD): ICD-10-CM

## 2025-05-20 ASSESSMENT — PAIN SCALES - GENERAL: PAINLEVEL_OUTOF10: 0

## 2025-06-12 ENCOUNTER — APPOINTMENT (OUTPATIENT)
Dept: MATERNAL FETAL MEDICINE | Age: 40
End: 2025-06-12

## 2025-06-12 ENCOUNTER — HOSPITAL ENCOUNTER (OUTPATIENT)
Dept: MRI IMAGING | Age: 40
Discharge: HOME | End: 2025-06-12
Attending: OBSTETRICS & GYNECOLOGY

## 2025-06-12 VITALS
SYSTOLIC BLOOD PRESSURE: 116 MMHG | DIASTOLIC BLOOD PRESSURE: 77 MMHG | HEIGHT: 65 IN | WEIGHT: 141 LBS | BODY MASS INDEX: 23.49 KG/M2 | HEART RATE: 103 BPM | OXYGEN SATURATION: 97 %

## 2025-06-12 DIAGNOSIS — O02.1 IUFD AT LESS THAN 20 WEEKS OF GESTATION (CMD): ICD-10-CM

## 2025-06-12 DIAGNOSIS — O09.522 MULTIGRAVIDA OF ADVANCED MATERNAL AGE IN SECOND TRIMESTER (CMD): ICD-10-CM

## 2025-06-12 DIAGNOSIS — Z3A.16 16 WEEKS GESTATION OF PREGNANCY (CMD): ICD-10-CM

## 2025-06-12 DIAGNOSIS — O43.022 TWIN TO TWIN TRANSFUSION IN SECOND TRIMESTER (CMD): ICD-10-CM

## 2025-06-12 DIAGNOSIS — O34.219 PREVIOUS CESAREAN SECTION COMPLICATING PREGNANCY, ANTEPARTUM CONDITION OR COMPLICATION (CMD): ICD-10-CM

## 2025-06-12 DIAGNOSIS — O30.032 MONOCHORIONIC DIAMNIOTIC TWIN GESTATION IN SECOND TRIMESTER (CMD): ICD-10-CM

## 2025-06-12 DIAGNOSIS — O02.1 IUFD AT LESS THAN 20 WEEKS OF GESTATION (CMD): Primary | ICD-10-CM

## 2025-06-12 DIAGNOSIS — Z3A.20 20 WEEKS GESTATION OF PREGNANCY (CMD): ICD-10-CM

## 2025-06-12 DIAGNOSIS — O09.522 MULTIGRAVIDA OF ADVANCED MATERNAL AGE IN SECOND TRIMESTER (CMD): Primary | ICD-10-CM

## 2025-06-12 DIAGNOSIS — O31.12X2: ICD-10-CM

## 2025-06-12 PROCEDURE — 74712 MRI FETAL SNGL/1ST GESTATION: CPT

## 2025-06-12 ASSESSMENT — PAIN SCALES - GENERAL: PAINLEVEL_OUTOF10: 0

## 2025-06-16 ENCOUNTER — E-ADVICE (OUTPATIENT)
Dept: MATERNAL FETAL MEDICINE | Age: 40
End: 2025-06-16

## 2025-06-19 ENCOUNTER — APPOINTMENT (OUTPATIENT)
Age: 40
End: 2025-06-19

## 2025-06-19 ENCOUNTER — E-ADVICE (OUTPATIENT)
Dept: MATERNAL FETAL MEDICINE | Age: 40
End: 2025-06-19

## 2025-06-19 ENCOUNTER — APPOINTMENT (OUTPATIENT)
Dept: MATERNAL FETAL MEDICINE | Age: 40
End: 2025-06-19

## 2025-06-19 ENCOUNTER — ANCILLARY PROCEDURE (OUTPATIENT)
Dept: MATERNAL FETAL MEDICINE | Age: 40
End: 2025-06-19
Attending: PEDIATRICS

## 2025-06-19 DIAGNOSIS — O31.22X1: Primary | ICD-10-CM

## 2025-06-19 DIAGNOSIS — O31.22X1: ICD-10-CM

## 2025-06-19 DIAGNOSIS — O34.219 PREVIOUS CESAREAN DELIVERY AFFECTING PREGNANCY, ANTEPARTUM (CMD): ICD-10-CM

## 2025-06-19 DIAGNOSIS — Z3A.21 21 WEEKS GESTATION OF PREGNANCY (CMD): ICD-10-CM

## 2025-06-19 DIAGNOSIS — O09.522 ELDERLY MULTIGRAVIDA IN SECOND TRIMESTER (CMD): ICD-10-CM

## 2025-06-19 PROCEDURE — 99203 OFFICE O/P NEW LOW 30 MIN: CPT | Performed by: PEDIATRICS

## 2025-09-04 ENCOUNTER — APPOINTMENT (OUTPATIENT)
Dept: MATERNAL FETAL MEDICINE | Age: 40
End: 2025-09-04

## 2025-09-11 ENCOUNTER — APPOINTMENT (OUTPATIENT)
Dept: MATERNAL FETAL MEDICINE | Age: 40
End: 2025-09-11

## (undated) DEVICE — 3M™ MEDIPORE™ H SOFT CLOTH SURGICAL TAPE 2864, 4 INCH X 10 YARD (10CM X 9,14M), 12 ROLLS/CASE: Brand: 3M™ MEDIPORE™

## (undated) NOTE — LETTER
[address]  [id number]  [phone]    Blood Transfusion Consent    In the course of your treatment, it may become necessary to administer a transfusion of blood or blood components. This form provides basic information concerning this procedure and, if signed by you, authorizes its administration. By signing this form, you agree that all of your questions about the administration of blood or blood products have been answered by the ordering medical professional or designee. Description of Procedure  Blood is introduced into one of your veins, commonly in the arm, using a sterilized disposable needle. The amount of blood transfused, and whether the transfusion will be of blood or blood components is a judgement the physician will make based on your particular needs. Risks  The transfusion is a common procedure of low risk. MINOR AND TEMPORARY REACTIONS ARE NOT UNCOMMON, including a slight bruise, swelling or local reaction in the area where the needle pierces your skin, or a nonserious reaction to the transfused material itself, including headache, fever or mild skin reaction, such as rash. Serious reactions are possible, though very unlikely, and include severe allergic reaction (shock) and destruction (hemolysis) of transfused blood cells. Infectious diseases which are known to be transmitted by blood transfusion include certain types of viral Hepatitis(liver infection from a virus), Human Immunodeficiency Virus (HIV-1,2) infection, a viral infection known to cause Acquired Immunodeficiency Syndrome (AIDS), as well as certain other bacterial, viral, and parasitic diseases. While a minimal risk of acquiring an infectious disease from transfused blood exists, in accordance with the Federal and State law, all due care has been taken in donor selection and testing to avoid transmission of disease.     Alternatives  If loss of blood poses serious threats during your treatment, THERE IS NO EFFECTIVE ALTERNATIVE TO BLOOD TRANSFUSION. However, if you have any further questions on this matter, your provider will fully explain the alternatives to you if it has not already been done. I, ______________________________, have read/had read to me the above. I understand the matters bearing on the decision whether or not to authorize a transfusion of blood or blood components. I have no questions which have not been answered to my full satisfaction.  I hereby consent to such transfusion as my physician may deem necessary or advisable in the course of my treatment.      ______________________________________________                    ___________________________  Shaggy Orf of Patient or Responsible party in case of minor,                 (Printed Name of Patient or incompetent, or unconscious patient)              Responsible Party)      ___________________________               _____________________  (Relationship to Patient if not self)                                    (Date and Time)      __________________________                                                           ______________________              (Signature of Witness)               (Printed Name of Witness)       Language line ()    Patient Name: Fernando Lopez    : 1985                 Printed: 2023     Medical Record #: [id number]      Rev: 2023